# Patient Record
Sex: MALE | Race: WHITE | NOT HISPANIC OR LATINO | Employment: PART TIME | URBAN - METROPOLITAN AREA
[De-identification: names, ages, dates, MRNs, and addresses within clinical notes are randomized per-mention and may not be internally consistent; named-entity substitution may affect disease eponyms.]

---

## 2019-07-05 ENCOUNTER — HOSPITAL ENCOUNTER (EMERGENCY)
Facility: HOSPITAL | Age: 39
Discharge: HOME | End: 2019-07-05
Attending: EMERGENCY MEDICINE | Admitting: EMERGENCY MEDICINE
Payer: COMMERCIAL

## 2019-07-05 VITALS
SYSTOLIC BLOOD PRESSURE: 135 MMHG | RESPIRATION RATE: 14 BRPM | HEIGHT: 70 IN | HEART RATE: 68 BPM | BODY MASS INDEX: 26.48 KG/M2 | TEMPERATURE: 97.3 F | OXYGEN SATURATION: 98 % | WEIGHT: 185 LBS | DIASTOLIC BLOOD PRESSURE: 89 MMHG

## 2019-07-05 DIAGNOSIS — T40.601A OPIATE OVERDOSE (HCC): Primary | ICD-10-CM

## 2019-07-05 LAB
ALBUMIN SERPL BCP-MCNC: 3.8 G/DL (ref 3.5–5)
ALP SERPL-CCNC: 55 U/L (ref 46–116)
ALT SERPL W P-5'-P-CCNC: 49 U/L (ref 12–78)
AMPHETAMINES SERPL QL SCN: POSITIVE
ANION GAP SERPL CALCULATED.3IONS-SCNC: 6 MMOL/L (ref 4–13)
AST SERPL W P-5'-P-CCNC: 61 U/L (ref 5–45)
ATRIAL RATE: 80 BPM
BARBITURATES UR QL: NEGATIVE
BASOPHILS # BLD AUTO: 0.02 THOUSANDS/ΜL (ref 0–0.1)
BASOPHILS NFR BLD AUTO: 0 % (ref 0–1)
BENZODIAZ UR QL: POSITIVE
BILIRUB SERPL-MCNC: 0.2 MG/DL (ref 0.2–1)
BUN SERPL-MCNC: 26 MG/DL (ref 5–25)
CALCIUM SERPL-MCNC: 8.4 MG/DL (ref 8.3–10.1)
CHLORIDE SERPL-SCNC: 104 MMOL/L (ref 100–108)
CO2 SERPL-SCNC: 28 MMOL/L (ref 21–32)
COCAINE UR QL: NEGATIVE
CREAT SERPL-MCNC: 1.22 MG/DL (ref 0.6–1.3)
EOSINOPHIL # BLD AUTO: 0.09 THOUSAND/ΜL (ref 0–0.61)
EOSINOPHIL NFR BLD AUTO: 1 % (ref 0–6)
ERYTHROCYTE [DISTWIDTH] IN BLOOD BY AUTOMATED COUNT: 12.4 % (ref 11.6–15.1)
ETHANOL EXG-MCNC: 0 MG/DL
GFR SERPL CREATININE-BSD FRML MDRD: 76 ML/MIN/1.73SQ M
GLUCOSE SERPL-MCNC: 97 MG/DL (ref 65–140)
HCT VFR BLD AUTO: 38.4 % (ref 36.5–49.3)
HGB BLD-MCNC: 12.8 G/DL (ref 12–17)
IMM GRANULOCYTES # BLD AUTO: 0.02 THOUSAND/UL (ref 0–0.2)
IMM GRANULOCYTES NFR BLD AUTO: 0 % (ref 0–2)
LYMPHOCYTES # BLD AUTO: 1.36 THOUSANDS/ΜL (ref 0.6–4.47)
LYMPHOCYTES NFR BLD AUTO: 21 % (ref 14–44)
MCH RBC QN AUTO: 30.5 PG (ref 26.8–34.3)
MCHC RBC AUTO-ENTMCNC: 33.3 G/DL (ref 31.4–37.4)
MCV RBC AUTO: 92 FL (ref 82–98)
METHADONE UR QL: POSITIVE
MONOCYTES # BLD AUTO: 0.44 THOUSAND/ΜL (ref 0.17–1.22)
MONOCYTES NFR BLD AUTO: 7 % (ref 4–12)
NEUTROPHILS # BLD AUTO: 4.53 THOUSANDS/ΜL (ref 1.85–7.62)
NEUTS SEG NFR BLD AUTO: 71 % (ref 43–75)
NRBC BLD AUTO-RTO: 0 /100 WBCS
OPIATES UR QL SCN: NEGATIVE
P AXIS: 65 DEGREES
PCP UR QL: NEGATIVE
PLATELET # BLD AUTO: 208 THOUSANDS/UL (ref 149–390)
PMV BLD AUTO: 9.9 FL (ref 8.9–12.7)
POTASSIUM SERPL-SCNC: 3.9 MMOL/L (ref 3.5–5.3)
PR INTERVAL: 164 MS
PROT SERPL-MCNC: 7.2 G/DL (ref 6.4–8.2)
QRS AXIS: 76 DEGREES
QRSD INTERVAL: 108 MS
QT INTERVAL: 408 MS
QTC INTERVAL: 470 MS
RBC # BLD AUTO: 4.19 MILLION/UL (ref 3.88–5.62)
SODIUM SERPL-SCNC: 138 MMOL/L (ref 136–145)
T WAVE AXIS: 50 DEGREES
THC UR QL: POSITIVE
VENTRICULAR RATE: 80 BPM
WBC # BLD AUTO: 6.46 THOUSAND/UL (ref 4.31–10.16)

## 2019-07-05 PROCEDURE — 99285 EMERGENCY DEPT VISIT HI MDM: CPT

## 2019-07-05 PROCEDURE — 99283 EMERGENCY DEPT VISIT LOW MDM: CPT | Performed by: EMERGENCY MEDICINE

## 2019-07-05 PROCEDURE — 80053 COMPREHEN METABOLIC PANEL: CPT | Performed by: EMERGENCY MEDICINE

## 2019-07-05 PROCEDURE — 93005 ELECTROCARDIOGRAM TRACING: CPT

## 2019-07-05 PROCEDURE — 96360 HYDRATION IV INFUSION INIT: CPT

## 2019-07-05 PROCEDURE — 82075 ASSAY OF BREATH ETHANOL: CPT | Performed by: EMERGENCY MEDICINE

## 2019-07-05 PROCEDURE — 85025 COMPLETE CBC W/AUTO DIFF WBC: CPT | Performed by: EMERGENCY MEDICINE

## 2019-07-05 PROCEDURE — 93010 ELECTROCARDIOGRAM REPORT: CPT | Performed by: INTERNAL MEDICINE

## 2019-07-05 PROCEDURE — 80307 DRUG TEST PRSMV CHEM ANLYZR: CPT | Performed by: EMERGENCY MEDICINE

## 2019-07-05 PROCEDURE — 36415 COLL VENOUS BLD VENIPUNCTURE: CPT | Performed by: EMERGENCY MEDICINE

## 2019-07-05 RX ORDER — NALOXONE HYDROCHLORIDE 4 MG/.1ML
1 SPRAY NASAL AS NEEDED
Qty: 2 EACH | Refills: 0 | Status: SHIPPED | OUTPATIENT
Start: 2019-07-05

## 2019-07-05 RX ORDER — NALOXONE HYDROCHLORIDE 1 MG/ML
1 INJECTION PARENTERAL ONCE
Status: COMPLETED | OUTPATIENT
Start: 2019-07-05 | End: 2019-07-05

## 2019-07-05 RX ORDER — TESTOSTERONE CYPIONATE 200 MG/ML
VIAL (ML) INTRAMUSCULAR
COMMUNITY
Start: 2019-04-25 | End: 2020-04-24

## 2019-07-05 RX ORDER — LISINOPRIL 10 MG/1
TABLET ORAL
COMMUNITY
Start: 2016-04-24

## 2019-07-05 RX ORDER — AMLODIPINE BESYLATE 5 MG/1
5 TABLET ORAL DAILY
COMMUNITY
Start: 2017-09-07

## 2019-07-05 RX ADMIN — SODIUM CHLORIDE 1000 ML: 0.9 INJECTION, SOLUTION INTRAVENOUS at 11:21

## 2019-07-05 NOTE — ED PROVIDER NOTES
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  police gave narcan and still had agonal respirations  EMS arrived and was bagging him and gave   4mg and he had spontaneous resp  This is a 82-year-old male who presents for evaluation after narcotic overdose while at work he was given intranasal and then intravenous Narcan and he awoke promptly currently has no complaints he is awake alert denies any suicidal ideations he does not want inpatient rehabilitation services      History provided by:  Patient and EMS personnel  Medical Problem   Location:  Narcotic  Quality:  Overdose  Chronicity:  Recurrent  Context:  Narcotic overdose while at work revived by Narcan      Prior to Admission Medications   Prescriptions Last Dose Informant Patient Reported? Taking? Testosterone Cypionate 200 MG/ML SOLN   Yes Yes   Si 2 cc every 4 days   amLODIPine (NORVASC) 5 mg tablet   Yes Yes   Sig: Take 5 mg by mouth daily   lisinopril (ZESTRIL) 10 mg tablet   Yes Yes   Si mg daily      Facility-Administered Medications: None       Past Medical History:   Diagnosis Date    Drug addict ([facility] [address] )     Hypertension        History reviewed  No pertinent surgical history  History reviewed  No pertinent family history  I have reviewed and agree with the history as documented  Social History     Tobacco Use    Smoking status: Current Every Day Smoker    Smokeless tobacco: Never Used   Substance Use Topics    Alcohol use: Yes    Drug use: Yes     Types: Heroin, Marijuana, Prescription     Comment: methadone        Review of Systems   Psychiatric/Behavioral: Negative for suicidal ideas  All other systems reviewed and are negative  Physical Exam  Physical Exam   Constitutional: He is oriented to person, place, and time  He appears well-developed and well-nourished  No distress     HENT:   Head: Normocephalic and atraumatic  Right Ear: External ear normal    Left Ear: External ear normal    Nose: Nose normal    Mouth/Throat: Oropharynx is clear and moist    Eyes: Pupils are equal, round, and reactive to light  EOM are normal  Right eye exhibits no discharge  Left eye exhibits no discharge  No scleral icterus  Neck: Neck supple  No JVD present  No tracheal deviation present  Cardiovascular: Normal rate, regular rhythm and intact distal pulses  Exam reveals no gallop and no friction rub  No murmur heard  Pulmonary/Chest: Effort normal and breath sounds normal  No stridor  No respiratory distress  He has no wheezes  Abdominal: Soft  Bowel sounds are normal    Musculoskeletal: Normal range of motion  He exhibits no edema, tenderness or deformity  Neurological: He is alert and oriented to person, place, and time  No cranial nerve deficit  He exhibits normal muscle tone  Coordination normal    Skin: Skin is warm and dry  He is not diaphoretic  Psychiatric: He has a normal mood and affect  His behavior is normal  Thought content normal    Denies any suicidal intention   Nursing note and vitals reviewed        Vital Signs  ED Triage Vitals   Temperature Pulse Respirations Blood Pressure SpO2   07/05/19 1006 07/05/19 1006 07/05/19 1006 07/05/19 1006 07/05/19 1006   (!) 97 3 °F (36 3 °C) 91 20 133/73 99 %      Temp src Heart Rate Source Patient Position - Orthostatic VS BP Location FiO2 (%)   -- 07/05/19 1200 07/05/19 1200 07/05/19 1200 --    Monitor Lying Right arm       Pain Score       07/05/19 1006       No Pain           Vitals:    07/05/19 1006 07/05/19 1015 07/05/19 1200 07/05/19 1315   BP: 133/73 128/78 143/75 135/89   Pulse: 91 86 71 68   Patient Position - Orthostatic VS:   Lying Lying         Visual Acuity  Visual Acuity      Most Recent Value   L Pupil Size (mm)  3   R Pupil Size (mm)  3          ED Medications  Medications   naloxone (FOR EMS ONLY) (NARCAN) 2 MG/2ML injection 2 mg (0 mg Does not apply Given to EMS 7/5/19 1229)   sodium chloride 0 9 % bolus 1,000 mL (0 mL Intravenous Stopped 7/5/19 1229)       Diagnostic Studies  Results Reviewed     Procedure Component Value Units Date/Time    Rapid drug screen, urine [[id number]]  (Abnormal) Collected:  07/05/19 1243    Lab Status:  Final result Specimen:  Urine, Clean Catch Updated:  07/05/19 1315     Amph/Meth UR Positive     Barbiturate Ur Negative     Benzodiazepine Urine Positive     Cocaine Urine Negative     Methadone Urine Positive     Opiate Urine Negative     PCP Ur Negative     THC Urine Positive    Narrative:       Presumptive report  If requested, specimen will be sent to reference lab for confirmation  FOR MEDICAL PURPOSES ONLY  IF CONFIRMATION NEEDED PLEASE CONTACT THE LAB WITHIN 5 DAYS      Drug Screen Cutoff Levels:  AMPHETAMINE/METHAMPHETAMINES  1000 ng/mL  BARBITURATES     200 ng/mL  BENZODIAZEPINES     200 ng/mL  COCAINE      300 ng/mL  METHADONE      300 ng/mL  OPIATES      300 ng/mL  PHENCYCLIDINE     25 ng/mL  THC       50 ng/mL      POCT alcohol breath test [[id number]]  (Normal) Resulted:  07/05/19 1207    Lab Status:  Final result Updated:  07/05/19 1207     EXTBreath Alcohol 0 000    Comprehensive metabolic panel [[id number]]  (Abnormal) Collected:  07/05/19 1114    Lab Status:  Final result Specimen:  Blood from Arm, Right Updated:  07/05/19 1159     Sodium 138 mmol/L      Potassium 3 9 mmol/L      Chloride 104 mmol/L      CO2 28 mmol/L      ANION GAP 6 mmol/L      BUN 26 mg/dL      Creatinine 1 22 mg/dL      Glucose 97 mg/dL      Calcium 8 4 mg/dL      AST 61 U/L      ALT 49 U/L      Alkaline Phosphatase 55 U/L      Total Protein 7 2 g/dL      Albumin 3 8 g/dL      Total Bilirubin 0 20 mg/dL      eGFR 76 ml/min/1 73sq m     Narrative:       [place] guidelines for Chronic Kidney Disease (CKD):     Stage 1 with normal or high GFR (GFR > 90 mL/min/1 73 square meters)    Stage 2 Mild CKD (GFR = 60-89 mL/min/1 73 square meters)    Stage 3A Moderate CKD (GFR = 45-59 mL/min/1 73 square meters)    Stage 3B Moderate CKD (GFR = 30-44 mL/min/1 73 square meters)    Stage 4 Severe CKD (GFR = 15-29 mL/min/1 73 square meters)    Stage 5 End Stage CKD (GFR <15 mL/min/1 73 square meters)  Note: GFR calculation is accurate only with a steady state creatinine    CBC and differential [[id number]] Collected:  07/05/19 1114    Lab Status:  Final result Specimen:  Blood from Arm, Right Updated:  07/05/19 1126     WBC 6 46 Thousand/uL      RBC 4 19 Million/uL      Hemoglobin 12 8 g/dL      Hematocrit 38 4 %      MCV 92 fL      MCH 30 5 pg      MCHC 33 3 g/dL      RDW 12 4 %      MPV 9 9 fL      Platelets 613 Thousands/uL      nRBC 0 /100 WBCs      Neutrophils Relative 71 %      Immat GRANS % 0 %      Lymphocytes Relative 21 %      Monocytes Relative 7 %      Eosinophils Relative 1 %      Basophils Relative 0 %      Neutrophils Absolute 4 53 Thousands/µL      Immature Grans Absolute 0 02 Thousand/uL      Lymphocytes Absolute 1 36 Thousands/µL      Monocytes Absolute 0 44 Thousand/µL      Eosinophils Absolute 0 09 Thousand/µL      Basophils Absolute 0 02 Thousands/µL                  No orders to display              Procedures  ECG 12 Lead Documentation Only  Date/Time: 7/5/2019 11:20 AM  Performed by: Chana Ward DO  Authorized by: Chana Ward DO     ECG reviewed by me, the ED Provider: yes    Patient location:  ED  Rate:     ECG rate:  80  Rhythm:     Rhythm: sinus rhythm    Conduction:     Conduction: normal    T waves:     T waves: normal             ED Course                               MDM  Number of Diagnoses or Management Options  Diagnosis management comments: Narcotic overdose will continue to monitor and call [facility]       Amount and/or Complexity of Data Reviewed  Clinical lab tests: ordered  Tests in the radiology section of CPT®: ordered    Patient Progress  Patient progress: stable (Patient remains awake and alert will follow up outpatient rehabilitation services)      Disposition  Final diagnoses:   Opiate overdose ([address] )     Time reflects when diagnosis was documented in both MDM as applicable and the Disposition within this note     Time User Action Codes Description Comment    7/5/2019  1:44 PM Qian Grace6 [facility] Opiate overdose [facility])       ED Disposition     ED Disposition Condition Date/Time Comment    Discharge Stable Fri Jul 5, 2019  1:44 PM David Sergeant discharge to home/self care  Follow-up Information     Follow up With Specialties Details Why Contact Info    Erum Vick MD Family Medicine In 1 week [address] In 1 week Intake [address] [address] [address] [facility]            Patient's Medications   Discharge Prescriptions    NALOXONE HCL (NARCAN) 4 MG/0 1ML LIQD    0 1 mL (4 mg total) into each nostril as needed (Narcotic overdose)       Start Date: 7/5/2019  End Date: --       Order Dose: 4 mg       Quantity: 2 each    Refills: 0     No discharge procedures on file      ED Provider  Electronically Signed by           John Wan DO  07/05/19 4349

## 2019-07-05 NOTE — ED NOTES
Mary Brown from [address] to hospital from Haylee Chin, [facility]  07/05/19 2781

## 2019-07-05 NOTE — ED NOTES
Patient out of bed to commode chair, patient was found to have multiple orange and light green pills in his cargo shorts  Pills given to [place] police   Patient remains awake and orient     Elma Loco RN  07/05/19 1028

## 2021-12-02 ENCOUNTER — EVALUATION (OUTPATIENT)
Dept: PHYSICAL THERAPY | Facility: CLINIC | Age: 41
End: 2021-12-02
Payer: COMMERCIAL

## 2021-12-02 DIAGNOSIS — M54.50 CHRONIC BILATERAL LOW BACK PAIN WITHOUT SCIATICA: Primary | ICD-10-CM

## 2021-12-02 DIAGNOSIS — G89.29 CHRONIC BILATERAL LOW BACK PAIN WITHOUT SCIATICA: Primary | ICD-10-CM

## 2021-12-02 PROCEDURE — 97162 PT EVAL MOD COMPLEX 30 MIN: CPT | Performed by: PHYSICAL THERAPIST

## 2021-12-02 PROCEDURE — 97110 THERAPEUTIC EXERCISES: CPT | Performed by: PHYSICAL THERAPIST

## 2021-12-06 ENCOUNTER — APPOINTMENT (OUTPATIENT)
Dept: PHYSICAL THERAPY | Facility: CLINIC | Age: 41
End: 2021-12-06
Payer: COMMERCIAL

## 2021-12-09 ENCOUNTER — OFFICE VISIT (OUTPATIENT)
Dept: PHYSICAL THERAPY | Facility: CLINIC | Age: 41
End: 2021-12-09
Payer: COMMERCIAL

## 2021-12-09 DIAGNOSIS — M54.50 CHRONIC BILATERAL LOW BACK PAIN WITHOUT SCIATICA: Primary | ICD-10-CM

## 2021-12-09 DIAGNOSIS — G89.29 CHRONIC BILATERAL LOW BACK PAIN WITHOUT SCIATICA: Primary | ICD-10-CM

## 2021-12-09 PROCEDURE — 97110 THERAPEUTIC EXERCISES: CPT | Performed by: PHYSICAL THERAPIST

## 2021-12-09 PROCEDURE — 97140 MANUAL THERAPY 1/> REGIONS: CPT | Performed by: PHYSICAL THERAPIST

## 2021-12-09 PROCEDURE — 97112 NEUROMUSCULAR REEDUCATION: CPT | Performed by: PHYSICAL THERAPIST

## 2021-12-14 ENCOUNTER — APPOINTMENT (OUTPATIENT)
Dept: PHYSICAL THERAPY | Facility: CLINIC | Age: 41
End: 2021-12-14
Payer: COMMERCIAL

## 2021-12-16 ENCOUNTER — APPOINTMENT (OUTPATIENT)
Dept: PHYSICAL THERAPY | Facility: CLINIC | Age: 41
End: 2021-12-16
Payer: COMMERCIAL

## 2021-12-21 ENCOUNTER — APPOINTMENT (OUTPATIENT)
Dept: PHYSICAL THERAPY | Facility: CLINIC | Age: 41
End: 2021-12-21
Payer: COMMERCIAL

## 2021-12-23 ENCOUNTER — APPOINTMENT (OUTPATIENT)
Dept: PHYSICAL THERAPY | Facility: CLINIC | Age: 41
End: 2021-12-23
Payer: COMMERCIAL

## 2021-12-28 ENCOUNTER — APPOINTMENT (OUTPATIENT)
Dept: PHYSICAL THERAPY | Facility: CLINIC | Age: 41
End: 2021-12-28
Payer: COMMERCIAL

## 2022-10-04 ENCOUNTER — APPOINTMENT (RX ONLY)
Dept: URBAN - METROPOLITAN AREA CLINIC 374 | Facility: CLINIC | Age: 42
Setting detail: DERMATOLOGY
End: 2022-10-04

## 2022-10-04 DIAGNOSIS — L81.4 OTHER MELANIN HYPERPIGMENTATION: ICD-10-CM

## 2022-10-04 DIAGNOSIS — D22 MELANOCYTIC NEVI: ICD-10-CM

## 2022-10-04 DIAGNOSIS — D18.0 HEMANGIOMA: ICD-10-CM

## 2022-10-04 DIAGNOSIS — Z71.89 OTHER SPECIFIED COUNSELING: ICD-10-CM

## 2022-10-04 DIAGNOSIS — L30.8 OTHER SPECIFIED DERMATITIS: ICD-10-CM

## 2022-10-04 PROBLEM — D22.61 MELANOCYTIC NEVI OF RIGHT UPPER LIMB, INCLUDING SHOULDER: Status: ACTIVE | Noted: 2022-10-04

## 2022-10-04 PROBLEM — D22.62 MELANOCYTIC NEVI OF LEFT UPPER LIMB, INCLUDING SHOULDER: Status: ACTIVE | Noted: 2022-10-04

## 2022-10-04 PROBLEM — D18.01 HEMANGIOMA OF SKIN AND SUBCUTANEOUS TISSUE: Status: ACTIVE | Noted: 2022-10-04

## 2022-10-04 PROBLEM — D22.5 MELANOCYTIC NEVI OF TRUNK: Status: ACTIVE | Noted: 2022-10-04

## 2022-10-04 PROBLEM — D22.71 MELANOCYTIC NEVI OF RIGHT LOWER LIMB, INCLUDING HIP: Status: ACTIVE | Noted: 2022-10-04

## 2022-10-04 PROBLEM — D22.72 MELANOCYTIC NEVI OF LEFT LOWER LIMB, INCLUDING HIP: Status: ACTIVE | Noted: 2022-10-04

## 2022-10-04 PROCEDURE — ? PRESCRIPTION

## 2022-10-04 PROCEDURE — ? MEDICATION COUNSELING

## 2022-10-04 PROCEDURE — ? COUNSELING

## 2022-10-04 PROCEDURE — ? FULL BODY SKIN EXAM

## 2022-10-04 PROCEDURE — 99203 OFFICE O/P NEW LOW 30 MIN: CPT

## 2022-10-04 PROCEDURE — ? PRESCRIPTION MEDICATION MANAGEMENT

## 2022-10-04 PROCEDURE — ? SUNSCREEN RECOMMENDATIONS

## 2022-10-04 RX ORDER — CLOBETASOL PROPIONATE 0.5 MG/G
OINTMENT TOPICAL BID
Qty: 60 | Refills: 3 | Status: ERX | COMMUNITY
Start: 2022-10-04

## 2022-10-04 RX ADMIN — CLOBETASOL PROPIONATE: 0.5 OINTMENT TOPICAL at 00:00

## 2022-10-04 ASSESSMENT — LOCATION SIMPLE DESCRIPTION DERM
LOCATION SIMPLE: ABDOMEN
LOCATION SIMPLE: RIGHT CHEEK
LOCATION SIMPLE: LEFT UPPER BACK
LOCATION SIMPLE: LEFT UPPER ARM
LOCATION SIMPLE: LEFT HAND
LOCATION SIMPLE: RIGHT PRETIBIAL REGION
LOCATION SIMPLE: LEFT FOREARM
LOCATION SIMPLE: LEFT PRETIBIAL REGION
LOCATION SIMPLE: LEFT CHEEK
LOCATION SIMPLE: RIGHT HAND
LOCATION SIMPLE: LEFT THIGH
LOCATION SIMPLE: CHEST
LOCATION SIMPLE: RIGHT FOREARM
LOCATION SIMPLE: RIGHT THIGH
LOCATION SIMPLE: RIGHT UPPER ARM

## 2022-10-04 ASSESSMENT — LOCATION DETAILED DESCRIPTION DERM
LOCATION DETAILED: LEFT RADIAL DORSAL HAND
LOCATION DETAILED: LEFT INFERIOR CENTRAL MALAR CHEEK
LOCATION DETAILED: LEFT DISTAL ULNAR DORSAL FOREARM
LOCATION DETAILED: LEFT MEDIAL UPPER BACK
LOCATION DETAILED: RIGHT DISTAL POSTERIOR UPPER ARM
LOCATION DETAILED: LEFT ANTERIOR DISTAL THIGH
LOCATION DETAILED: LEFT MEDIAL SUPERIOR CHEST
LOCATION DETAILED: RIGHT DORSAL MIDDLE METACARPOPHALANGEAL JOINT
LOCATION DETAILED: RIGHT INFERIOR CENTRAL MALAR CHEEK
LOCATION DETAILED: LEFT DISTAL POSTERIOR UPPER ARM
LOCATION DETAILED: RIGHT DISTAL DORSAL FOREARM
LOCATION DETAILED: PERIUMBILICAL SKIN
LOCATION DETAILED: RIGHT DISTAL PRETIBIAL REGION
LOCATION DETAILED: RIGHT ANTERIOR DISTAL THIGH
LOCATION DETAILED: LEFT PROXIMAL PRETIBIAL REGION

## 2022-10-04 ASSESSMENT — LOCATION ZONE DERM
LOCATION ZONE: ARM
LOCATION ZONE: FACE
LOCATION ZONE: TRUNK
LOCATION ZONE: HAND
LOCATION ZONE: LEG

## 2022-10-04 NOTE — PROCEDURE: PRESCRIPTION MEDICATION MANAGEMENT
Initiate Treatment: clobetasol 0.05 % topical ointment: Apply a thin layer to hands BID PRN flare
Render In Strict Bullet Format?: No
Detail Level: Zone

## 2025-01-27 ENCOUNTER — OFFICE VISIT (OUTPATIENT)
Dept: SLEEP CENTER | Facility: CLINIC | Age: 45
End: 2025-01-27
Payer: COMMERCIAL

## 2025-01-27 VITALS
BODY MASS INDEX: 29.35 KG/M2 | WEIGHT: 205 LBS | DIASTOLIC BLOOD PRESSURE: 80 MMHG | SYSTOLIC BLOOD PRESSURE: 132 MMHG | HEIGHT: 70 IN

## 2025-01-27 DIAGNOSIS — R53.83 FATIGUE, UNSPECIFIED TYPE: ICD-10-CM

## 2025-01-27 DIAGNOSIS — I10 PRIMARY HYPERTENSION: ICD-10-CM

## 2025-01-27 DIAGNOSIS — G47.33 OSA (OBSTRUCTIVE SLEEP APNEA): Primary | ICD-10-CM

## 2025-01-27 DIAGNOSIS — G47.9 SLEEP DISORDER, UNSPECIFIED: ICD-10-CM

## 2025-01-27 DIAGNOSIS — R09.81 NASAL CONGESTION: ICD-10-CM

## 2025-01-27 PROCEDURE — G2211 COMPLEX E/M VISIT ADD ON: HCPCS | Performed by: PSYCHIATRY & NEUROLOGY

## 2025-01-27 PROCEDURE — 99203 OFFICE O/P NEW LOW 30 MIN: CPT | Performed by: PSYCHIATRY & NEUROLOGY

## 2025-01-27 RX ORDER — CETIRIZINE HYDROCHLORIDE 10 MG/1
1 TABLET ORAL DAILY
COMMUNITY
Start: 2024-11-25

## 2025-01-27 RX ORDER — BUPRENORPHINE 300 MG/1
SOLUTION SUBCUTANEOUS
COMMUNITY
Start: 2025-01-16

## 2025-01-27 RX ORDER — ALBUTEROL SULFATE 90 UG/1
INHALANT RESPIRATORY (INHALATION)
COMMUNITY
Start: 2024-12-14

## 2025-01-27 RX ORDER — TESTOSTERONE CYPIONATE 1000 MG/10ML
100 INJECTION, SOLUTION INTRAMUSCULAR ONCE
COMMUNITY

## 2025-01-27 NOTE — PROGRESS NOTES
As notes he has presented in 2017 with witnessed apneas.  Testing not available.  Was on methadone at that time, which could contribute to apnea (as can Sublocade which he is on now)     He is doing great with APAP, AHI is normal with treatment, though at the upper limit of normal.         Has some sleepiness, feels better overall with PAP use.  Needs to nap at lunch recently.  No drowsy driving.

## 2025-01-27 NOTE — PROGRESS NOTES
Name: Dylan Tan      : 1980      MRN: [id number]  Encounter Provider: SLEEP FELLOW Liza PEDROBARBY  Encounter Date: 2025   Encounter department: [facility]IC[facility]  :  Assessment & Plan  TERRENCE (obstructive sleep apnea)  - Patient was diagnosed in , used PAP therapy for about 3 months. Had worsening of symptoms (snoring and witnessed apneas) , repeat evaluation done in  noted for AHI of 22- patient reported value with central events. We do not have the sleep study results. Compliance data reviewed below.     Patient's compliance data from 10- to 2025 noted for 92.22% compliance greater than 4 hours with average use of 7 hours and 6 minutes.  Patient is currently using AutoPap 4-10 cm H2O.  Average 95th percentile pressure at 8.9, average AHI of 4.2, average central apnea index of 2.4,    - Continue APAP 4-10 cmH2O with a full-face mask.   -  Will order split study for repeat evaluation as we do not have the sleep studys that were done prior. If we are able to obtain the results then will switch the order to CPAP study.   - Refill of supplies will be sent to the patient's DME.  - Explained the importance of keeping the machine clean, and that they should be eligible for refills of supplies every 3-6 months depending on insurance.  We also discussed the insurance compliance requirements.  - Encouraged healthy lifestyle with adequate sleep (7-9 hours per night), healthy balanced diet and routine exercise.  Explained the importance of avoiding driving while drowsy.  - Follow-up after the sleep study.   Orders:    Split Study; Future    Ambulatory Referral to Otolaryngology; Future    Primary hypertension  Patients BP in office today is 132/80.   We reviewed the association between untreated obstructive sleep apnea and the increased risk for hypertension. Patient to continue on prescribed anti-hypertensive therapy and follow up with PCP for continuity of care.         Nasal  "congestion  Discussed use of Flonase as needed. Patient did report some nose bleeds rarely. Discussed cessation of use if this occurs and follow up with PCP.   Orders:    Ambulatory Referral to Otolaryngology; Future    Fatigue, unspecified type  Will order some labs for Vitamin D and B12. Will follow up after the sleep study.   Orders:    Vitamin D 25 hydroxy; Future    Vitamin B12; Future    Sleep disorder, unspecified    Orders:    Ambulatory Referral to Sleep Medicine    Seen with Dr. Gomez    History of Present Illness   HPI  Carmine is a 44-year-old male with past medical history of hypertension, hx of substance use( on buprenorphine long acting injection), testosterone replacement therapy, who presents today for evaluation of sleep-related breathing disorder.  Patient was referred here by his primary care physician.    CC:\" I have been having allergy medications, resulting in not using the machine\"    Patient notes he was diagnosed in 2017, he did use PAP therapy for 3 months and discontinued use.  He noted his wife complained that his symptoms got worst and 2 years prior in 2022 he had a repeat evaluation, he notes he was told that he stopped breathing about 22 times per hour.  Noted he was in the moderate TERRENCE category.  Patient has been currently using the the Em CPAP machine.  He notes he is tolerating this well but recently has been having some issues with allergies and notes this has made it difficult with PAP use.  Patient does note that he is a mouth breather and the fullface mask has been working well for him.     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  Patient's current also works out he used to workout in the morning but now he is doing that in the afternoon.    He goes to work around 9:30 to 10 PM, he reports he can fall asleep pretty easily other than 1-2 times a month where he can take him a longer time.  He does report multiple awakenings anywhere from 3-4 times noting " sometimes to use the restroom, sometimes he is awoken by his dog who sleeps with him.  He notes he is a light sleeper and can be easily aroused with changes in condition in the bedroom.  He typically wakes up around 5 to 6 AM.  This is consistent from weekdays and weekends.  He reports getting anywhere from 6-1/2 to 7 hours of sleep.  Patient notes he has been consistent with use of his CPAP, but notes he has not fully felt refreshed.  Snoring has gotten better.  He does feel a little bit more energetic now with the use of PAP therapy.    Patient is currently taking melatonin 5 mg as needed intermittently throughout the month, he has a prescription of doxepin but has not used it as he reports grogginess when he takes it a little too late.  Patient has tried Provigil in the past but currently is not on any medications.  He did note that when he was on it he did feel some subjective benefit during the day.  Patient did endorse that he was taking a caffeine pill in the morning, 2-3 energy drinks during the day and would be able to go to sleep.  He did note that in the past couple weeks he has cut out the to 1 energy drink around 1 to 3 PM.  Patient does use this and nicotine patches.  Last use after dinner.  Denies any alcohol use.  Denies any other recreational drugs.    Denies symptoms of RLS, PLMs, parasomnias, RBD, narcolepsy.    Patient's compliance data from 10- to 1- noted for 92.22% compliance greater than 4 hours with average use of 7 hours and 6 minutes.  Patient is currently using AutoPap 4-10 cm H2O.  Average 95th percentile pressure at 8.9, average AHI of 4.2, average central apnea index of 2.4,    Sitting and reading: (Patient-Rptd) High chance of dozing  Watching TV: (Patient-Rptd) Moderate chance of dozing  Sitting, inactive in a public place (e.g. a theatre or a meeting): (Patient-Rptd) Slight chance of dozing  As a passenger in a car for an hour without a break: (Patient-Rptd) Slight  chance of dozing  Lying down to rest in the afternoon when circumstances permit: (Patient-Rptd) High chance of dozing  Sitting and talking to someone: (Patient-Rptd) Would never doze  Sitting quietly after a lunch without alcohol: (Patient-Rptd) Moderate chance of dozing  In a car, while stopped for a few minutes in traffic: (Patient-Rptd) Slight chance of dozing  Total score: (Patient-Rptd) 13     Review of Systems  Pertinent positives/negatives included in HPI and also as noted:     Past Medical History   Past Medical History:   Diagnosis Date    Drug addict (HCC)     Hypertension      No past surgical history on file.  No family history on file.   reports that he has been smoking. He has never used smokeless tobacco. He reports current alcohol use. He reports current drug use. Drugs: Heroin, Marijuana, and Prescription.  Current Outpatient Medications on File Prior to Visit   Medication Sig Dispense Refill    albuterol (PROVENTIL HFA,VENTOLIN HFA) 90 mcg/act inhaler USE 1-2 PUFFS 4 TIMES A DAY AS NEEDED      amLODIPine (NORVASC) 5 mg tablet Take 5 mg by mouth daily      cetirizine (ZyrTEC) 10 mg tablet Take 1 tablet by mouth in the morning      lisinopril (ZESTRIL) 10 mg tablet 20 mg daily      Sublocade 300 MG/1.5ML       testosterone cypionate (DEPO-TESTOSTERONE) 100 mg/mL IM injection Inject 100 mg into a muscle once      Naloxone HCl (NARCAN) 4 MG/0.1ML LIQD 0.1 mL (4 mg total) into each nostril as needed (Narcotic overdose) 2 each 0     No current facility-administered medications on file prior to visit.     Allergies   Allergen Reactions    Mirtazapine       Current Outpatient Medications on File Prior to Visit   Medication Sig Dispense Refill    albuterol (PROVENTIL HFA,VENTOLIN HFA) 90 mcg/act inhaler USE 1-2 PUFFS 4 TIMES A DAY AS NEEDED      amLODIPine (NORVASC) 5 mg tablet Take 5 mg by mouth daily      cetirizine (ZyrTEC) 10 mg tablet Take 1 tablet by mouth in the morning      lisinopril (ZESTRIL) 10  "mg tablet 20 mg daily      Sublocade 300 MG/1.5ML       testosterone cypionate (DEPO-TESTOSTERONE) 100 mg/mL IM injection Inject 100 mg into a muscle once      Naloxone HCl (NARCAN) 4 MG/0.1ML LIQD 0.1 mL (4 mg total) into each nostril as needed (Narcotic overdose) 2 each 0     No current facility-administered medications on file prior to visit.      Social History     Tobacco Use    Smoking status: Every Day    Smokeless tobacco: Never   Substance and Sexual Activity    Alcohol use: Yes    Drug use: Yes     Types: Heroin, Marijuana, Prescription     Comment: methadone    Sexual activity: Not on file     Objective   /80   Ht 5' 10\" (1.778 m)   Wt 93 kg (205 lb)   BMI 29.41 kg/m²     Neck Circumference: 17  Physical Exam  Mallampati Grade : Mallampati 2-3  Oropharynx : appears normal   Tonsil size : not enlarged   Tongue : Large tongue and Scalloping  Soft palate and uvula : Normal soft palate  Hard Palate : normal   Neck Circumference : Neck Circumference 17\"  Dental Stucture and Dentition : Normal dentition       Appearance : no distress, alert, cooperative  Mental Status. Memory, and Affect : alert and oriented, normal affect, makes good eye contact, provides a detailed history  Cardiac- : regular rate and rhythm, S1, S2 normal, no murmur, click, rub or gallop  Pulmonary : clear to auscultation bilaterally  Cranial Nerves: CN II-XII grossly intact  No peripheral edema       Data  Lab Results   Component Value Date    HGB 12.8 07/05/2019    HCT 38.4 07/05/2019    MCV 92 07/05/2019      Lab Results   Component Value Date    CALCIUM 8.4 07/05/2019    K 3.9 07/05/2019    CO2 28 07/05/2019     07/05/2019    BUN 26 (H) 07/05/2019    CREATININE 1.22 07/05/2019     No results found for: \"IRON\", \"TIBC\", \"FERRITIN\"  Lab Results   Component Value Date    AST 61 (H) 07/05/2019    ALT 49 07/05/2019       Long Gomez MD  Sleep Medicine Fellow    "

## 2025-02-09 ENCOUNTER — HOSPITAL ENCOUNTER (OUTPATIENT)
Dept: SLEEP CENTER | Facility: CLINIC | Age: 45
Discharge: HOME | End: 2025-02-09
Payer: COMMERCIAL

## 2025-02-09 DIAGNOSIS — G47.33 OSA (OBSTRUCTIVE SLEEP APNEA): ICD-10-CM

## 2025-02-09 PROCEDURE — 95811 POLYSOM 6/>YRS CPAP 4/> PARM: CPT

## 2025-02-09 PROCEDURE — 95811 POLYSOM 6/>YRS CPAP 4/> PARM: CPT | Performed by: INTERNAL MEDICINE

## 2025-02-10 DIAGNOSIS — G47.33 OSA (OBSTRUCTIVE SLEEP APNEA): Primary | ICD-10-CM

## 2025-02-10 DIAGNOSIS — G47.31 CENTRAL SLEEP APNEA: ICD-10-CM

## 2025-02-10 NOTE — PROGRESS NOTES
Sleep Study Documentation    Pre-Sleep Study       Sleep testing procedure explained to patient:YES    Patient napped prior to study:NO    Caffeine:Dayshift worker after 12PM.  Caffeine use:YES- energy drinks  2 to 3 servings    Alcohol:Dayshift workers after 5PM: Alcohol use:NO    Typical day for patient:YES       Study Documentation    Sleep Study Indications: EDS, loud snoring and choking.    Sleep Study: Split Optimal PAP pressure: +17/13cm  Leak:None  Snore:Eliminated  REM Obtained:yes  Supplemental O2: no    Minimum SaO2 87%  Baseline SaO2 92%  PAP mask choice (final)[employer] Simplus  PAP mask type:full face  Minimum SaO2 at final PAP pressure 91%  Mode of Therapy:BiPAP  ETCO2:No  CPAP changed to BiPAP:Yes. If yes why Couldn't tolerate high pressure.    EKG abnormalities: yes:  EPOCH example and comments:     EEG abnormalities: no    Were abnormal behaviors in sleep observed:NO    Is Total Sleep Study Recording Time < 2 hours: N/A    Is Total Sleep Study Recording Time > 2 hours but study is incomplete: N/A    Is Total Sleep Study Recording Time 6 hours or more but sleep was not obtained: NO    Patient classification: employed       Post-Sleep Study    Medication used at bedtime or during sleep study:NO    Patient reports time it took to fall asleep:30 to 60 minutes    Patient reports waking up during study:3 or more times.  Patient reports returning to sleep in 10 to 30 minutes.    Patient reports sleeping 4 to 6 hours with dreaming.    Does the Patient feel this is a typical night of sleep:worse than usual    Patient rated sleepiness: Somewhat sleepy or tired    PAP treatment:yes: Post PAP treatment patient reports feeling unchanged and would wear PAP mask at home.

## 2025-02-20 ENCOUNTER — HOSPITAL ENCOUNTER (OUTPATIENT)
Dept: NON INVASIVE DIAGNOSTICS | Facility: HOSPITAL | Age: 45
Discharge: HOME | End: 2025-02-20
Payer: COMMERCIAL

## 2025-02-20 VITALS
DIASTOLIC BLOOD PRESSURE: 80 MMHG | HEIGHT: 70 IN | BODY MASS INDEX: 29.35 KG/M2 | SYSTOLIC BLOOD PRESSURE: 132 MMHG | WEIGHT: 205.03 LBS

## 2025-02-20 DIAGNOSIS — G47.31 CENTRAL SLEEP APNEA: ICD-10-CM

## 2025-02-20 DIAGNOSIS — G47.33 OSA (OBSTRUCTIVE SLEEP APNEA): ICD-10-CM

## 2025-02-20 PROCEDURE — 93306 TTE W/DOPPLER COMPLETE: CPT | Performed by: INTERNAL MEDICINE

## 2025-02-20 PROCEDURE — 93306 TTE W/DOPPLER COMPLETE: CPT

## 2025-02-21 ENCOUNTER — TELEPHONE (OUTPATIENT)
Dept: SLEEP CENTER | Facility: CLINIC | Age: 45
End: 2025-02-21

## 2025-02-21 LAB
AORTIC ROOT: 3.6 CM
AORTIC VALVE MEAN VELOCITY: 8 M/S
ASCENDING AORTA: 3.2 CM
AV AREA BY CONTINUOUS VTI: 5.9 CM2
AV AREA PEAK VELOCITY: 5 CM2
AV LVOT MEAN GRADIENT: 2 MMHG
AV LVOT PEAK GRADIENT: 5 MMHG
AV MEAN PRESS GRAD SYS DOP V1V2: 3 MMHG
AV ORIFICE AREA US: 5.85 CM2
AV PEAK GRADIENT: 6 MMHG
AV VELOCITY RATIO: 1.02
AV VMAX SYS DOP: 1.25 M/S
BSA FOR ECHO PROCEDURE: 2.11 M2
DOP CALC AO VTI: 24.74 CM
DOP CALC LVOT AREA: 5.72 CM2
DOP CALC LVOT CARDIAC INDEX: 4.95 L/MIN/M2
DOP CALC LVOT CARDIAC OUTPUT: 10.45 L/MIN
DOP CALC LVOT DIAMETER: 2.7 CM
DOP CALC LVOT PEAK VEL VTI: 25.29 CM
DOP CALC LVOT PEAK VEL: 1.1 M/S
DOP CALC LVOT STROKE INDEX: 66.8 ML/M2
DOP CALC LVOT STROKE VOLUME: 144.73
E WAVE DECELERATION TIME: 164 MS
E/A RATIO: 1.22
FRACTIONAL SHORTENING: 12 (ref 28–44)
INTERVENTRICULAR SEPTUM IN DIASTOLE (PARASTERNAL SHORT AXIS VIEW): 1.4 CM
INTERVENTRICULAR SEPTUM: 1.4 CM (ref 0.6–1.1)
LAAS-AP2: 25.1 CM2
LAAS-AP4: 21.3 CM2
LEFT ATRIUM SIZE: 3.6 CM
LEFT ATRIUM VOLUME (MOD BIPLANE): 80 ML
LEFT ATRIUM VOLUME INDEX (MOD BIPLANE): 37.9 ML/M2
LEFT INTERNAL DIMENSION IN SYSTOLE: 3.6 CM (ref 2.1–4)
LEFT VENTRICLE DIASTOLIC VOLUME (MOD BIPLANE): 118 ML
LEFT VENTRICLE DIASTOLIC VOLUME INDEX (MOD BIPLANE): 55.9 ML/M2
LEFT VENTRICLE SYSTOLIC VOLUME (MOD BIPLANE): 50 ML
LEFT VENTRICLE SYSTOLIC VOLUME INDEX (MOD BIPLANE): 23.7 ML/M2
LEFT VENTRICULAR INTERNAL DIMENSION IN DIASTOLE: 4.1 CM (ref 3.5–6)
LEFT VENTRICULAR POSTERIOR WALL IN END DIASTOLE: 1.4 CM
LEFT VENTRICULAR STROKE VOLUME: 22 ML
LV EF BIPLANE MOD: 58 %
LV EF US.2D.A4C+ESTIMATED: 64 %
LVSV (TEICH): 22 ML
MV E'TISSUE VEL-LAT: 12 CM/S
MV E'TISSUE VEL-SEP: 15 CM/S
MV PEAK A VEL: 0.5 M/S
MV PEAK E VEL: 61 CM/S
MV STENOSIS PRESSURE HALF TIME: 48 MS
MV VALVE AREA P 1/2 METHOD: 4.58
RIGHT ATRIUM AREA SYSTOLE A4C: 16.9 CM2
RIGHT VENTRICLE ID DIMENSION: 3.6 CM
SL CV LEFT ATRIUM LENGTH A2C: 6 CM
SL CV LV EF: 60
SL CV PED ECHO LEFT VENTRICLE DIASTOLIC VOLUME (MOD BIPLANE) 2D: 75 ML
SL CV PED ECHO LEFT VENTRICLE SYSTOLIC VOLUME (MOD BIPLANE) 2D: 53 ML
TRICUSPID ANNULAR PLANE SYSTOLIC EXCURSION: 2.4 CM

## 2025-02-21 NOTE — TELEPHONE ENCOUNTER
Split study resulted, confirms moderate TERRENCE (AHI-20.4) with more than 40% of the respiratory events being central apneas, which was not able to be effectively treated with CPAP or BiPAP therapy.    Echocardiogram recommended (completed 2/20/2025), with plan for ASV titration study pending echo results.    Call placed to patient, left call back message     [employer] message sent providing results, recommendations and instructions.

## 2025-03-07 ENCOUNTER — RESULTS FOLLOW-UP (OUTPATIENT)
Dept: SLEEP CENTER | Facility: CLINIC | Age: 45
End: 2025-03-07

## 2025-03-17 DIAGNOSIS — G47.31 CENTRAL SLEEP APNEA: Primary | ICD-10-CM

## 2025-03-18 ENCOUNTER — HOSPITAL ENCOUNTER (OUTPATIENT)
Dept: SLEEP CENTER | Facility: CLINIC | Age: 45
Discharge: HOME | End: 2025-03-18
Payer: COMMERCIAL

## 2025-03-18 DIAGNOSIS — G47.31 CENTRAL SLEEP APNEA: ICD-10-CM

## 2025-03-18 PROCEDURE — 95811 POLYSOM 6/>YRS CPAP 4/> PARM: CPT

## 2025-03-18 PROCEDURE — 95811 POLYSOM 6/>YRS CPAP 4/> PARM: CPT | Performed by: INTERNAL MEDICINE

## 2025-03-18 NOTE — TELEPHONE ENCOUNTER
Per chart, patient scheduled ASV study via [employer].  Scheduled 8/6/25 in [place].     Notified office staff via high [employer] Excel spreadsheet to expedite testing.

## 2025-03-19 NOTE — PROGRESS NOTES
Sleep Study Documentation    Pre-Sleep Study       Sleep testing procedure explained to patient:YES    Patient napped prior to study:YES- more than 30 minutes. Napped after 2PM: yes    Caffeine:Dayshift worker after 12PM.  Caffeine use:YES- coffee  6 ounces    Alcohol:Dayshift workers after 5PM: Alcohol use:YES-Beer 2 to 3 servings    Typical day for patient:YES       Study Documentation    Sleep Study Indications: Central apnea, Had cpap, Diagnostic studies    S3leep Study: Treatment   ASV 11/15/3Small  Snore:Eliminated  REM Obtained:yes  Supplemental O2: no    Minimum SaO2 85  Baseline SaO2 92  PAP mask tried (list all)Ana Singh Medium   PAP mask choice (final)SAME  PAP mask type:full face  PAP pressure at which snoring was eliminated 11/15/3  Minimum SaO2 at final PAP pressure 11/15/3  Mode of Therapy:ASV max EPAP:11/14/3  max pressure support:14  min pressure support:3  rate setting:  rise time:  flex:  time inspired:  max pressure:11/14/3     ETCO2:No  CPAP changed to BiPAP:No           EKG abnormalities: no     EEG abnormalities: no    Were abnormal behaviors in sleep observed:NO    Is Total Sleep Study Recording Time < 2 hours: N/A    Is Total Sleep Study Recording Time > 2 hours but study is incomplete: N/A    Is Total Sleep Study Recording Time 6 hours or more but sleep was not obtained: NO          Post-Sleep Study    Medication used at bedtime or during sleep study:NO    Patient reports time it took to fall asleep:less than 20 minutes    Patient reports waking up during study:1 to 2 times.  Patient reports returning to sleep without difficulty.    Patient reports sleeping 6 to 8 hours with dreaming.    Does the Patient feel this is a typical night of sleep:better than usual    Patient rated sleepiness: Somewhat sleepy or tired    PAP treatment:yes: Post PAP treatment patient reports feeling better and  would wear PAP mask at home.

## 2025-03-24 ENCOUNTER — RESULTS FOLLOW-UP (OUTPATIENT)
Dept: SLEEP CENTER | Facility: CLINIC | Age: 45
End: 2025-03-24

## 2025-03-24 DIAGNOSIS — G47.31 CENTRAL SLEEP APNEA: Primary | ICD-10-CM

## 2025-03-24 DIAGNOSIS — G47.33 OSA (OBSTRUCTIVE SLEEP APNEA): ICD-10-CM

## 2025-03-26 ENCOUNTER — TELEPHONE (OUTPATIENT)
Dept: SLEEP CENTER | Facility: CLINIC | Age: 45
End: 2025-03-26

## 2025-04-15 LAB

## 2025-04-21 LAB
DME PARACHUTE DELIVERY DATE ACTUAL: NORMAL
DME PARACHUTE DELIVERY DATE EXPECTED: NORMAL
DME PARACHUTE DELIVERY DATE REQUESTED: NORMAL
DME PARACHUTE ITEM DESCRIPTION: NORMAL
DME PARACHUTE ORDER STATUS: NORMAL
DME PARACHUTE SUPPLIER NAME: NORMAL
DME PARACHUTE SUPPLIER PHONE: NORMAL

## 2025-06-16 ENCOUNTER — TELEPHONE (OUTPATIENT)
Dept: SLEEP CENTER | Facility: HOSPITAL | Age: 45
End: 2025-06-16

## 2025-06-16 ENCOUNTER — OFFICE VISIT (OUTPATIENT)
Dept: SLEEP CENTER | Facility: HOSPITAL | Age: 45
End: 2025-06-16
Payer: COMMERCIAL

## 2025-06-16 ENCOUNTER — TELEPHONE (OUTPATIENT)
Dept: SLEEP CENTER | Facility: CLINIC | Age: 45
End: 2025-06-16

## 2025-06-16 VITALS
DIASTOLIC BLOOD PRESSURE: 92 MMHG | SYSTOLIC BLOOD PRESSURE: 146 MMHG | HEART RATE: 95 BPM | OXYGEN SATURATION: 98 % | BODY MASS INDEX: 32.21 KG/M2 | HEIGHT: 70 IN | WEIGHT: 225 LBS

## 2025-06-16 DIAGNOSIS — G47.19 EXCESSIVE DAYTIME SLEEPINESS: ICD-10-CM

## 2025-06-16 DIAGNOSIS — G47.33 OSA (OBSTRUCTIVE SLEEP APNEA): ICD-10-CM

## 2025-06-16 DIAGNOSIS — G47.31 CENTRAL SLEEP APNEA: Primary | ICD-10-CM

## 2025-06-16 PROCEDURE — 99214 OFFICE O/P EST MOD 30 MIN: CPT | Performed by: PSYCHIATRY & NEUROLOGY

## 2025-06-16 RX ORDER — AMLODIPINE BESYLATE 10 MG/1
1 TABLET ORAL DAILY
COMMUNITY
Start: 2025-05-03

## 2025-06-16 RX ORDER — LISINOPRIL 30 MG/1
1 TABLET ORAL DAILY
COMMUNITY
Start: 2025-04-27

## 2025-06-16 RX ORDER — GABAPENTIN 100 MG/1
2 CAPSULE ORAL 2 TIMES DAILY
COMMUNITY
Start: 2025-04-17

## 2025-06-16 RX ORDER — BUPRENORPHINE 8 MG/1
1 TABLET SUBLINGUAL 3 TIMES DAILY
COMMUNITY
Start: 2024-09-10

## 2025-06-16 RX ORDER — TESTOSTERONE CYPIONATE 200 MG/ML
INJECTION, SOLUTION INTRAMUSCULAR
COMMUNITY
Start: 2025-05-22

## 2025-06-16 NOTE — PATIENT INSTRUCTIONS
Your blood pressure is high today- it is important to make sure this controlled before I can safely prescribe modafinil

## 2025-06-16 NOTE — PROGRESS NOTES
"Name: Dylan Tan      : 1980      MRN: [id number]  Encounter Provider: Camilo Gomez MD  Encounter Date: 2025   [facility]  :  Assessment & Plan      Assessment & Plan        History of Present Illness {?Quick Links Encounters * My Last Note * Last Note in Specialty * Snapshot * Since Last Visit * History :86111}  History of Present Illness       {Patient accompanied at this vist by (Optional):45471}    { Used (Optional):16505}    Sitting and reading: (Patient-Rptd) (P) High chance of dozing  Watching TV: (Patient-Rptd) (P) Moderate chance of dozing  Sitting, inactive in a public place (e.g. a theatre or a meeting): (Patient-Rptd) (P) Slight chance of dozing  As a passenger in a car for an hour without a break: (Patient-Rptd) (P) Slight chance of dozing  Lying down to rest in the afternoon when circumstances permit: (Patient-Rptd) (P) High chance of dozing  Sitting and talking to someone: (Patient-Rptd) (P) Would never doze  Sitting quietly after a lunch without alcohol: (Patient-Rptd) (P) Slight chance of dozing  In a car, while stopped for a few minutes in traffic: (Patient-Rptd) (P) Would never doze  Total score: (Patient-Rptd) (P) 11     Review of Systems  Pertinent positives/negatives included in HPI and also as noted:     {Select to Display PMH (Optional):67942}  Objective {?Quick Links Trend Vitals * Enter New Vitals * Results Review * Timeline (Adult) * Labs * Imaging * Cardiology * Procedures * Lung Cancer Screening * Surgical eConsent :28318}  /92 (BP Location: Left arm)   Pulse 95   Ht 5' 10\" (1.778 m)   Wt 102 kg (225 lb)   SpO2 98%   BMI 32.28 kg/m²        Physical Exam  Physical Exam    Visit Vitals  /92 (BP Location: Left arm)   Pulse 95   Ht 5' 10\" (1.778 m)   Wt 102 kg (225 lb)   SpO2 98%   BMI 32.28 kg/m²   Smoking Status Every Day   BSA 2.19 m²       {Mallampati/Delcid Grade " "(Optional):10353}  { Used (Optional):93063}  {Oropharynx (Optional):47613}  {Tonsil size (Optional):38674}  {SLEEP Tongue (Optional):28828}  {SLEEP Soft Palate (Optional):38217}  {SLEEP Hard Palate (Optional):27985}  {SLEEP Neck Circumference (Optional):06459}  {Neck:81433}  {NOSE (Optional):37460}  {Craniofacial anatomy (Optional):74334}  {Dental structure (Optional):18669}    {Appearance (Optional):70364::\"no distress\",\"alert\",\"cooperative\"}  {MENTAL STATUS (Optional):02660::\"alert and oriented, normal affect, makes good eye contact, provides a detailed history\"}  {Heart exam (Optional):02091::\"regular rate and rhythm, S1, S2 normal, no murmur, click, rub or gallop\"}  {SLEEP LUNG (Optional):88657}  {Neuro (Optional):48694::\"Cranial Nerves: CN II-XII grossly intact\"}  {Extremities (Optional):83053::\"No peripheral edema\"}    Results    Data  Lab Results   Component Value Date    HGB 12.8 07/05/2019    HCT 38.4 07/05/2019    MCV 92 07/05/2019      Lab Results   Component Value Date    CALCIUM 8.4 07/05/2019    K 3.9 07/05/2019    CO2 28 07/05/2019     07/05/2019    BUN 26 (H) 07/05/2019    CREATININE 1.22 07/05/2019     No results found for: \"IRON\", \"TIBC\", \"FERRITIN\"  Lab Results   Component Value Date    AST 61 (H) 07/05/2019    ALT 49 07/05/2019       {Administrative / Billing Section (Optional):07374}  "

## 2025-06-16 NOTE — ASSESSMENT & PLAN NOTE
He reports significant improvement in sleep quality with the new ASV machine, experiencing zero to one event per night. The previous issues of nosebleeds and nasal congestion have resolved with the new machine. Despite these improvements, he continues to experience daytime fatigue. The possibility of underlying trauma affecting his sleep was discussed.   Orders:  •  PAP DME Resupply/Reorder

## 2025-06-16 NOTE — PROGRESS NOTES
Name: Dylan Tan      : 1980      MRN: [id number]  Encounter Provider: Camilo Gomez MD  Encounter Date: 2025   Encounter department: [facility] [place]  :  Assessment & Plan  Central sleep apnea  He reports significant improvement in sleep quality with the new ASV machine, experiencing zero to one event per night. The previous issues of nosebleeds and nasal congestion have resolved with the new machine. Despite these improvements, he continues to experience daytime fatigue. The possibility of underlying trauma affecting his sleep was discussed.   Orders:  •  PAP DME Resupply/Reorder    TERRENCE (obstructive sleep apnea)    Orders:  •  PAP DME Resupply/Reorder    Excessive daytime sleepiness  His blood pressure is slightly elevated, which could be attributed to his high caffeine intake (approximately 700 mg daily). The potential use of modafinil was discussed, but it will only be considered once his blood pressure is well-controlled..             History of Present Illness   History of Present Illness  The patient is a 44-year-old male who returns for a follow-up visit. He was initially assessed in 2025. At that visit, it was discussed that he had a prior history of obstructive sleep apnea with an AHI of 22 diagnosed in 2017. He was on methadone at that time and is now on Sublocade. At his last visit, his AHI was normal with treatment. We discussed he had epistaxis and nasal congestion, and he was referred to ENT. In addition, we wanted to pursue a new CPAP machine as his previous machine, a Em 2 CPAP, did not have a heated hose. Therefore, a split night sleep study was ordered. He had the split night sleep study completed in 2025. That test showed moderate obstructive sleep apnea with an AHI of 20, although central events were observed with a central apnea index of 9. CPAP was then titrated during the night, and he was switched to bilevel PAP. Central apneas  persisted at all settings with an overall AHI of 47 on CPAP, 30 per hour of which were central apneas. On the basis of that, it was recommended he switch to ASV, and ASV titration was then performed in March 2025. That test showed a great result with an EPAP of 10, pressure support 3-15, he had an AHI of 0.7. ASV data reviewed today. AirCurve 11 ASV set at EPAP 7 to 10 cm H2O, pressure support 3-15 events per hour 0.1. Average session 7 hours 31 minutes used 51 over 51 days.    He reports improved sleep quality with the new machine, noting that he no longer experiences suffocation-like symptoms upon waking, which he used to experience with the old machine. He monitors his readings every morning and observes zero to one event occasionally. Despite this, he continues to experience daytime fatigue. His marriage counselor suspects potential underlying trauma issues impacting his sleep, but he does not report any nightmares or physical manifestations such as punching or kicking during sleep. He describes himself as a light sleeper, sensitive to temperature changes, and easily awakened by movements. His sleep schedule is consistent, typically retiring between 9:30 and 10:00 PM and waking between 5:00 and 5:30 AM on weekdays, and between 6:30 and 7:00 AM on weekends. He averages about 7 to 7.5 hours of sleep per night and does not usually nap during the day. He takes a 200 mg caffeine pill upon waking due to his aversion to coffee and consumes energy drinks throughout the day, totaling approximately 600 to 700 mg of caffeine daily. He reports falling asleep unintentionally while reading at night or watching movies with his wife, but not during the day unless he has had a particularly rough night's sleep. He does not experience sleep paralysis or hallucinations and reports no recent concerns about depression. He has noticed waking up at night due to sudden pressure from the ASV machine. He uses a [employer] full face memory  "foam mask with his new machine.    He reports no recent nosebleeds since starting the new machine but notes that he experiences severe allergies and congestion during this time of year, which previously prevented him from using his old CPAP machine due to difficulty breathing. He also reports that nasal sprays used in the past caused nosebleeds.    He monitors his blood pressure at home, which typically runs high at around 140/85. He has not seen Dr. De La Cruz for nearly a year.    He is currently taking amlodipine 10 mg, gabapentin 300 mg three times a day, lisinopril 20 mg, testosterone, and Subutex. He is not taking metformin as it was prescribed without discussion.    MEDICATIONS  Current: Amlodipine, gabapentin, lisinopril, testosterone, Subutex.  Discontinued: Methadone.             Sitting and reading: (Patient-Rptd) (P) High chance of dozing  Watching TV: (Patient-Rptd) (P) Moderate chance of dozing  Sitting, inactive in a public place (e.g. a theatre or a meeting): (Patient-Rptd) (P) Slight chance of dozing  As a passenger in a car for an hour without a break: (Patient-Rptd) (P) Slight chance of dozing  Lying down to rest in the afternoon when circumstances permit: (Patient-Rptd) (P) High chance of dozing  Sitting and talking to someone: (Patient-Rptd) (P) Would never doze  Sitting quietly after a lunch without alcohol: (Patient-Rptd) (P) Slight chance of dozing  In a car, while stopped for a few minutes in traffic: (Patient-Rptd) (P) Would never doze  Total score: (Patient-Rptd) (P) 11     Review of Systems  Pertinent positives/negatives included in HPI and also as noted:       Objective   /92 (BP Location: Left arm)   Pulse 95   Ht 5' 10\" (1.778 m)   Wt 102 kg (225 lb)   SpO2 98%   BMI 32.28 kg/m²        Physical Exam  Physical Exam  Lungs were auscultated.    Vital Signs  Blood pressure is slightly elevated.  Visit Vitals  /92 (BP Location: Left arm)   Pulse 95   Ht 5' 10\" (1.778 m)   Wt 102 " "kg (225 lb)   SpO2 98%   BMI 32.28 kg/m²   Smoking Status Every Day   BSA 2.19 m²                                               Results  Testing  Split night sleep study showed moderate obstructive sleep apnea with an AHI of 20 and central apnea index of 9. CPAP titration showed persistent central apneas at all settings with an overall AHI of 47 on CPAP, 30 of which were central apneas. ASV titration performed in March 2025 showed an AHI of 0.7 with an EPAP of 10, pressure support 3-15. ASV data reviewed today showed AirCurve 11 ASV set at EPAP 7 to 10 cm H2O, pressure support 3-15 events per hour 0.1, average session 7 hours 31 minutes used 51 over 51 days.  Data  Lab Results   Component Value Date    HGB 12.8 07/05/2019    HCT 38.4 07/05/2019    MCV 92 07/05/2019      Lab Results   Component Value Date    CALCIUM 8.4 07/05/2019    K 3.9 07/05/2019    CO2 28 07/05/2019     07/05/2019    BUN 26 (H) 07/05/2019    CREATININE 1.22 07/05/2019     No results found for: \"IRON\", \"TIBC\", \"FERRITIN\"  Lab Results   Component Value Date    AST 61 (H) 07/05/2019    ALT 49 07/05/2019         "

## 2025-06-17 LAB
DME PARACHUTE DELIVERY DATE REQUESTED: NORMAL
DME PARACHUTE DELIVERY DATE REQUESTED: NORMAL
DME PARACHUTE ITEM DESCRIPTION: NORMAL
DME PARACHUTE ORDER STATUS: NORMAL
DME PARACHUTE ORDER STATUS: NORMAL
DME PARACHUTE SUPPLIER NAME: NORMAL
DME PARACHUTE SUPPLIER NAME: NORMAL
DME PARACHUTE SUPPLIER PHONE: NORMAL
DME PARACHUTE SUPPLIER PHONE: NORMAL